# Patient Record
Sex: FEMALE | Race: WHITE | NOT HISPANIC OR LATINO
[De-identification: names, ages, dates, MRNs, and addresses within clinical notes are randomized per-mention and may not be internally consistent; named-entity substitution may affect disease eponyms.]

---

## 2017-06-27 ENCOUNTER — APPOINTMENT (OUTPATIENT)
Dept: ORTHOPEDIC SURGERY | Facility: CLINIC | Age: 67
End: 2017-06-27

## 2017-06-27 DIAGNOSIS — E03.9 HYPOTHYROIDISM, UNSPECIFIED: ICD-10-CM

## 2017-06-27 DIAGNOSIS — R73.03 PREDIABETES.: ICD-10-CM

## 2017-06-27 DIAGNOSIS — M25.562 PAIN IN LEFT KNEE: ICD-10-CM

## 2017-06-27 DIAGNOSIS — I10 ESSENTIAL (PRIMARY) HYPERTENSION: ICD-10-CM

## 2017-06-27 DIAGNOSIS — Z78.9 OTHER SPECIFIED HEALTH STATUS: ICD-10-CM

## 2017-06-27 RX ORDER — AMOXICILLIN 500 MG/1
500 CAPSULE ORAL
Qty: 8 | Refills: 0 | Status: COMPLETED | COMMUNITY
Start: 2017-06-13

## 2017-06-27 RX ORDER — LOSARTAN POTASSIUM 50 MG/1
50 TABLET, FILM COATED ORAL
Qty: 30 | Refills: 0 | Status: ACTIVE | COMMUNITY
Start: 2017-04-27

## 2017-06-27 RX ORDER — LINAGLIPTIN 5 MG/1
5 TABLET, FILM COATED ORAL
Qty: 30 | Refills: 0 | Status: ACTIVE | COMMUNITY
Start: 2016-12-29

## 2017-06-27 RX ORDER — LEVOTHYROXINE SODIUM 75 UG/1
75 TABLET ORAL
Qty: 30 | Refills: 0 | Status: ACTIVE | COMMUNITY
Start: 2017-06-01

## 2017-06-27 RX ORDER — NEBIVOLOL HYDROCHLORIDE 5 MG/1
5 TABLET ORAL
Qty: 30 | Refills: 0 | Status: ACTIVE | COMMUNITY
Start: 2017-04-03

## 2017-06-27 RX ORDER — AZITHROMYCIN 250 MG/1
250 TABLET, FILM COATED ORAL
Qty: 6 | Refills: 0 | Status: COMPLETED | COMMUNITY
Start: 2017-05-09

## 2017-06-27 RX ORDER — ATORVASTATIN CALCIUM 20 MG/1
20 TABLET, FILM COATED ORAL
Qty: 30 | Refills: 0 | Status: ACTIVE | COMMUNITY
Start: 2017-06-01

## 2017-06-27 RX ORDER — AMOXICILLIN 875 MG/1
875 TABLET, FILM COATED ORAL
Qty: 20 | Refills: 0 | Status: COMPLETED | COMMUNITY
Start: 2017-01-23

## 2017-06-27 RX ORDER — METFORMIN ER 500 MG 500 MG/1
500 TABLET ORAL
Qty: 60 | Refills: 0 | Status: ACTIVE | COMMUNITY
Start: 2017-05-22

## 2017-08-22 ENCOUNTER — APPOINTMENT (OUTPATIENT)
Dept: ORTHOPEDIC SURGERY | Facility: CLINIC | Age: 67
End: 2017-08-22
Payer: COMMERCIAL

## 2017-08-22 DIAGNOSIS — S83.242D OTHER TEAR OF MEDIAL MENISCUS, CURRENT INJURY, LEFT KNEE, SUBSEQUENT ENCOUNTER: ICD-10-CM

## 2017-08-22 PROCEDURE — 99213 OFFICE O/P EST LOW 20 MIN: CPT

## 2019-05-03 ENCOUNTER — APPOINTMENT (OUTPATIENT)
Dept: ORTHOPEDIC SURGERY | Facility: CLINIC | Age: 69
End: 2019-05-03
Payer: COMMERCIAL

## 2019-05-03 PROCEDURE — 99214 OFFICE O/P EST MOD 30 MIN: CPT

## 2019-05-03 NOTE — HISTORY OF PRESENT ILLNESS
[de-identified] : Ms. Bravo is now 69 y for evaluation of her foot which she injured yesterday. She was walking with other people and there was a decline in and then a barrier and she slipped and fell twisting her RIGHT foot. There is pain and she could not walk.\par She went to urgent care and had x-rays.She was told that there might be a fracture. They applied an Ace wrap and gave her an air cast splint and surgical shoe and crutches. Today she can put some weight on it. She did elevate. The wrap was feeling tight.

## 2019-05-03 NOTE — ASSESSMENT
[FreeTextEntry1] : 69 -year-old woman who twisted her RIGHT foot yesterday and appears to have a fracture of the cuboid clinically and may be visible on x-ray.\par She should use a surgical shoe and I applied an Ace wrap for some compression but it should not be too tight. I adjusted the crutches to the proper height and she can walk partial weightbearing on the RIGHT foot as pain allows. She should minimize weightbearing activity and walking. Elevate and ice for swelling. It will take 6-8 weeks for this to heal. If pain is getting worse she should let me know. She can move her ankle. She should sit on a stool in the shower. Tylenol or ibuprofen as needed.\par Followup in about 2-3 weeks

## 2019-05-03 NOTE — PHYSICAL EXAM
[Crutches] : ambulates with crutches [LE] : Sensory: Intact in bilateral lower extremities [DP] : dorsalis pedis 2+ and symmetric bilaterally [PT] : posterior tibial 2+ and symmetric bilaterally [de-identified] : bilateral feet\par Antalgic gait but she can walk partial weightbearing on the RIGHT.\par Moderate edema and ecchymoses RIGHT lateral midfoot or hindfoot.\par Skin is intact.\par Significant point tenderness over the cuboid laterally. No significant tenderness fifth metatarsal and minimally on the anterior process of the calcaneus. Nontender in the medial and lateral malleoli. Nontender anterior medial joint line. Minimally tender in the sinus Tarsi on the RIGHT. No tenderness LEFT.\par She can actively dorsiflex and plantar flex the RIGHT foot and ankle but with some pain and stiffness.\par Intact anterior tibial tendon, gastrocsoleus, peroneals and posterior tibial tendon and EHL.\par Foot is warm. Normal capillary refill. [de-identified] : \par She brought in x-rays from urgent care yesterday which were reviewed including 3 views of the RIGHT ankle AP, lateral and mortise and 3 views of the RIGHT foot. The ankle mortise is intact without any fractures. There is lucency in the medial talar dome consistent with an osteochondral lesion of the talus.\par On the foot x-rays there appears to be a carlos of bone lateral to the cuboid that may be consistent with a fracture and they may be seeing a hairline fracture through the cuboid nondisplaced but not definite. No other fractures seen and no malalignment.

## 2019-05-03 NOTE — PROCEDURE
[de-identified] : \par I wrapped a three-inch Ace wrap and applied the surgical shoe. She does not need the Aircast splint.

## 2019-05-21 ENCOUNTER — APPOINTMENT (OUTPATIENT)
Dept: ORTHOPEDIC SURGERY | Facility: CLINIC | Age: 69
End: 2019-05-21
Payer: COMMERCIAL

## 2019-05-21 PROCEDURE — 99213 OFFICE O/P EST LOW 20 MIN: CPT

## 2019-05-21 PROCEDURE — 73630 X-RAY EXAM OF FOOT: CPT | Mod: RT

## 2019-05-21 NOTE — ASSESSMENT
[FreeTextEntry1] : 70 yo with right foot twisting injury 2+ wks ago with Significant pain localized to the cuboid presumably with a nondisplaced fracture or bone bruising in the hindfoot/lateral ankle sprain. Her symptoms are improving.\par I recommended that she continue with either the walking boot or can transition to the surgical shoe as pain decreases and if the shoe becomes more comfortable. She was given a cane since she doesn't need crutches but was still limping. Elevate and ice. Ankle motion intermittently. Tylenol as needed. Followup in about 3-4 weeks.

## 2019-05-21 NOTE — PHYSICAL EXAM
[de-identified] : Right foot \par Resolving ecchymoses. Mild edema in the lateral hindfoot.\par Point tenderness localized to the cuboid. No significant tenderness fifth metatarsal, fourth metatarsal, calcaneus, malleoli. Nontender on the medial side of the foot.\par Intact ankle range of motion with about 70° dorsiflexion and 30° plantar flexion. Intact subtalar motion.\par Intact anterior tibial tendon, gastrocsoleus, peroneals, posterior tibial tendon and EHL all with good strength. Nails feel intact.\par Normal neurovascular exam. [de-identified] : \par x-rays right foot WB 3 views today show no visible fractures/abnormalities. Normal alignment.

## 2019-05-21 NOTE — HISTORY OF PRESENT ILLNESS
[de-identified] : Right foot is feeling partially better \par She is walking in the Walking boot most of the time and a surgical shoe at home. She stopped using the crutches. She finds the boot to be more supportive. There was a lot of bruising throughout the foot to the lateral ankle which is now getting better.She has pain in the lateral hindfoot

## 2019-06-10 ENCOUNTER — APPOINTMENT (OUTPATIENT)
Dept: ORTHOPEDIC SURGERY | Facility: CLINIC | Age: 69
End: 2019-06-10
Payer: COMMERCIAL

## 2019-06-10 PROCEDURE — 99213 OFFICE O/P EST LOW 20 MIN: CPT

## 2019-06-10 PROCEDURE — 73630 X-RAY EXAM OF FOOT: CPT | Mod: RT

## 2019-06-10 NOTE — HISTORY OF PRESENT ILLNESS
[de-identified] : 5 wks following foot/ankle sprain injury with suspected cuboid fx\par She has worn the surgical shoe and is walking without a cane. No pain walking in surgical  shoe now.\par No meds for it.\par There is a spot that is still very tender and it hurts to invert the foot. \par

## 2019-06-10 NOTE — PHYSICAL EXAM
[Slightly Antalgic] : slightly antalgic [Normal RLE] : Right Lower Extremity: No scars, rashes, lesions, ulcers, skin intact [Normal LLE] : Left Lower Extremity: No scars, rashes, lesions, ulcers, skin intact [de-identified] : Right Foot and Ankle: \par Gait is not antalgic.\par No Edema, ecchymoses, erythema.\par Ankle range of motion is with 7 degrees dorsiflexion and 35 degrees plantar flexion.\par Subtalar motion intact but pain on inversion in the lateral hindfoot.\par Hallux MP joint range of motion .  pain with ROM.\par Tender more dorsal cuboid and much less tender on the lateral cuboid and cuboid fifth metatarsal articulation. There may be some tenderness near the anterior process of the calcaneus and sinus tarsi area.\par  No Deformity.\par Motor: 5/5 ATT, GS , EHL, PTT/inversion, peroneals/eversion.\par Normal neurovascular exam\par  [de-identified] : \par x-rays of the right foot today 3 views show Intact cuboid and articulations appear intact. 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 I thought there might be an evulsion but looking back at other lateral x-rays have not seeing it.

## 2019-06-10 NOTE — ASSESSMENT
[FreeTextEntry1] : 68 yo right cuboid fx 5 wks ago healing.She should avoid inversion and eversion of any significant extent. She is going to wear a Ace wrap for some support and cushioning.\par Transition to supportive sneaker in the next 1-2 wks as tolerated. \par Warm soaks and ice and massage.\par Start PT in 2 wks.\par F/U 4 wks

## 2019-07-10 ENCOUNTER — APPOINTMENT (OUTPATIENT)
Dept: ORTHOPEDIC SURGERY | Facility: CLINIC | Age: 69
End: 2019-07-10
Payer: COMMERCIAL

## 2019-07-10 PROCEDURE — 99213 OFFICE O/P EST LOW 20 MIN: CPT

## 2019-07-10 NOTE — PHYSICAL EXAM
[LE] : Sensory: Intact in bilateral lower extremities [DP] : dorsalis pedis 2+ and symmetric bilaterally [PT] : posterior tibial 2+ and symmetric bilaterally [Normal LLE] : Left Lower Extremity: No scars, rashes, lesions, ulcers, skin intact [Normal RLE] : Right Lower Extremity: No scars, rashes, lesions, ulcers, skin intact [Normal Touch] : sensation intact for touch [de-identified] : RIGHT Foot and Ankle: \par Gait is not antalgic.\par The patient is able to walk on heels and toes and do a repetitive heel raise. More weakness doing a single heel raise on the RIGHT than LEFT but no significant pain\par slight antral lateral ankle Edema without ecchymoses, erythema.\par Ankle range of motion is with 7 degrees dorsiflexion and 35-40 degrees plantar flexion.\par Subtalar motion intact without pain.\par Hallux MP joint range of motion intact without pain with ROM.\par Tender mild the sinus Tarsi, between the calcaneal cuboid joint RIGHT.\par No Deformity except prominent navicular\par Motor: 5/5 ATT, GS , EHL, PTT/inversion, peroneals/eversion.\par Normal neurovascular exam\par

## 2019-07-10 NOTE — ASSESSMENT
[FreeTextEntry1] : A 69-year-old with RIGHT lateral hindfoot injury that look like a nondisplaced cuboid fracture.There may have been a slight avulsion anterior process calcaneus and she has an accessory navicular from the look and feel of it with minimal tenderness at that site. Overall the ankle and foot are improving with some mild residual discomfort. There is mild weakness. I recommended that she wear good supportive shoes such as sneakers. She can use the Ace support as needed. Ice and heat as needed. Strengthening exercises in physical therapy.\par Followup 6 weeks if it's not fully better

## 2019-07-10 NOTE — HISTORY OF PRESENT ILLNESS
[de-identified] : Ms. Bravo states that her RIGHT foot is feeling better but not 100%.\par RIGHT cuboid fracture occurred about 2 1/2 months ago.\par She is walking in Flat sneakers. She uses an Ace wrap support.\par She hasn't gone to physical therapy and it does feel somewhat weak.\par She walks without a limp and has been walking a fair bit and active. Mild residual swelling anterolateral ankle.\par She's not taking medication for  pain

## 2019-08-21 ENCOUNTER — APPOINTMENT (OUTPATIENT)
Dept: ORTHOPEDIC SURGERY | Facility: CLINIC | Age: 69
End: 2019-08-21
Payer: COMMERCIAL

## 2019-08-21 PROCEDURE — 99213 OFFICE O/P EST LOW 20 MIN: CPT

## 2019-08-21 NOTE — DISCUSSION/SUMMARY
[de-identified] : 69-year-old woman with RIGHT hindfoot sprain and probable cuboid nondisplaced fracture and possible tiny avulsion of the anterior process of the calcaneus. Her foot and ankle are doing much better but there is some lingering discomfort. Likely this will improve over the next couple months. She should do the home exercises she learned. Warm soaks and ice. Should walk and good supportive shoes as she is doing. If it's not better in the next 6-8 weeks she will come in for followup.\par If there is ongoing tenderness in the sinus Tarsi region I may consider a corticosteroid injection.

## 2019-08-21 NOTE — PHYSICAL EXAM
[LE] : Sensory: Intact in bilateral lower extremities [Normal RLE] : Right Lower Extremity: No scars, rashes, lesions, ulcers, skin intact [Normal LLE] : Left Lower Extremity: No scars, rashes, lesions, ulcers, skin intact [de-identified] : RIGHT  Foot and Ankle: \par Gait is not antalgic.\par The patient is able to walk on heels and toes without difficulty\par trace lateral hindfoot edema without ecchymoses, erythema.\par Ankle range of motion is with 7 degrees dorsiflexion and 35-40 degrees plantar flexion.\par Subtalar motion intact without any pain.\par Hallux MP joint range of motion intact without pain with ROM.\par Tender mildly in the sinus tarsi and over the cuboid .\par No Deformity.\par Motor: 5/5 ATT, GS , EHL, PTT/inversion, peroneals/eversion.\par Normal neurovascular exam\par

## 2019-08-21 NOTE — HISTORY OF PRESENT ILLNESS
[de-identified] : Ms. Bravo comes in for followup for her RIGHT foot injured 3-1/2 months ago with a cuboid fracture treated in a boot/surgical shoe. She has been doing physical therapy. Her RIGHT foot and ankle are feeling Mostly better but there is some lingering discomfort and tightness.\par She also gets some persistent mild swelling. He was dancing at a wedding a couple weeks ago in a small heel and this caused more pain temporarily. She's not taking anything for it now. She had done some physical therapy after I last saw her for about 4 weeks but then stopped because she didn't feel it was doing very much.

## 2019-09-06 ENCOUNTER — APPOINTMENT (OUTPATIENT)
Dept: OTOLARYNGOLOGY | Facility: CLINIC | Age: 69
End: 2019-09-06
Payer: COMMERCIAL

## 2019-09-06 VITALS
SYSTOLIC BLOOD PRESSURE: 123 MMHG | HEART RATE: 67 BPM | HEIGHT: 62 IN | WEIGHT: 145 LBS | DIASTOLIC BLOOD PRESSURE: 81 MMHG | BODY MASS INDEX: 26.68 KG/M2

## 2019-09-06 DIAGNOSIS — R05 COUGH: ICD-10-CM

## 2019-09-06 PROCEDURE — 31231 NASAL ENDOSCOPY DX: CPT

## 2019-09-06 PROCEDURE — 99213 OFFICE O/P EST LOW 20 MIN: CPT | Mod: 25

## 2019-09-06 NOTE — ASSESSMENT
[FreeTextEntry1] : YOUNG LOONEY has acute sinus infection and cough. I will prescribe Doxycycline and prednisone 30 mg taper (if needed). I will call with culture.

## 2019-09-06 NOTE — REVIEW OF SYSTEMS
[Nasal Congestion] : nasal congestion [Sinus Pain] : sinus pain [Sinus Pressure] : sinus pressure [Hoarseness] : hoarseness [Feeling Poorly] : feeling poorly [Feeling Tired] : feeling tired [Eye Pain] : eye pain [Patient Intake Form Reviewed] : Patient intake form was reviewed

## 2019-09-06 NOTE — HISTORY OF PRESENT ILLNESS
[de-identified] : YOUNG LOONEY is a 69 year woman with a history of several days of URI, facial pain congestion and cough. She is not having fever or chills. Her left ear hurts.She has upper dental throbbing pain.

## 2019-09-13 LAB — BACTERIA FLD CULT: ABNORMAL

## 2019-10-15 PROBLEM — S92.214D CLOSED NONDISPLACED FRACTURE OF CUBOID OF RIGHT FOOT WITH ROUTINE HEALING, SUBSEQUENT ENCOUNTER: Status: ACTIVE | Noted: 2019-05-03

## 2019-10-15 PROBLEM — S93.601D FOOT SPRAIN, RIGHT, SUBSEQUENT ENCOUNTER: Status: ACTIVE | Noted: 2019-07-10

## 2019-10-15 PROBLEM — M89.9 OSTEOCHONDRAL LESION OF TALAR DOME: Status: ACTIVE | Noted: 2019-05-03

## 2019-10-15 NOTE — PHYSICAL EXAM
[de-identified] : RIGHT  Foot and Ankle: \par Gait is not antalgic.\par The patient is able to walk on heels and toes and do a repetitive heel raise\par  Edema, ecchymoses, erythema.\par Ankle range of motion is with  degrees dorsiflexion and  degrees plantar flexion.\par Subtalar motion .\par Hallux MP joint range of motion .  pain with ROM.\par Tender .\par  Deformity.\par Motor: 5/5 ATT, GS , EHL, PTT/inversion, peroneals/eversion.\par Normal neurovascular exam\par

## 2019-10-15 NOTE — HISTORY OF PRESENT ILLNESS
[de-identified] : 5 months following RIGHT foot twisting injury with suspected nondisplaced cuboid fracturetreated with immobilization followed by physical therapy.\par Her foot is feeling

## 2019-10-15 NOTE — DISCUSSION/SUMMARY
[de-identified] : About 5 months following a RIGHT foot twisting injury with sprain and suspected cuboid fracture

## 2019-10-16 ENCOUNTER — APPOINTMENT (OUTPATIENT)
Dept: ORTHOPEDIC SURGERY | Facility: CLINIC | Age: 69
End: 2019-10-16

## 2019-10-16 DIAGNOSIS — S92.214D NONDISPLACED FRACTURE OF CUBOID BONE OF RIGHT FOOT, SUBSEQUENT ENCOUNTER FOR FRACTURE WITH ROUTINE HEALING: ICD-10-CM

## 2019-10-16 DIAGNOSIS — M94.9 DISORDER OF BONE, UNSPECIFIED: ICD-10-CM

## 2019-10-16 DIAGNOSIS — M89.9 DISORDER OF BONE, UNSPECIFIED: ICD-10-CM

## 2019-10-16 DIAGNOSIS — S93.601D UNSPECIFIED SPRAIN OF RIGHT FOOT, SUBSEQUENT ENCOUNTER: ICD-10-CM

## 2019-10-22 ENCOUNTER — APPOINTMENT (OUTPATIENT)
Dept: OTOLARYNGOLOGY | Facility: CLINIC | Age: 69
End: 2019-10-22
Payer: COMMERCIAL

## 2019-10-22 DIAGNOSIS — H90.42 SENSORINEURAL HEARING LOSS, UNILATERAL, LEFT EAR, WITH UNRESTRICTED HEARING ON THE CONTRALATERAL SIDE: ICD-10-CM

## 2019-10-22 DIAGNOSIS — R42 DIZZINESS AND GIDDINESS: ICD-10-CM

## 2019-10-22 PROCEDURE — 99213 OFFICE O/P EST LOW 20 MIN: CPT

## 2019-10-22 PROCEDURE — 92567 TYMPANOMETRY: CPT

## 2019-10-22 PROCEDURE — 92557 COMPREHENSIVE HEARING TEST: CPT

## 2019-10-30 PROBLEM — R42 VERTIGO: Status: ACTIVE | Noted: 2019-10-30

## 2019-10-30 NOTE — ASSESSMENT
[FreeTextEntry1] : She has low grade symptoms.  She is carrying on ADL.\par I will treat her for a vestibular neuronitis.\par Pending her clinical course she will see me in fu.\par She will also get me a copy of her audiogram from last year.\par She may need and Mri to rule out a  retrocochlear lesion.\par \par

## 2019-10-30 NOTE — HISTORY OF PRESENT ILLNESS
[de-identified] : She has a few days of spontaneous imbalance, dizziness and perhaps positional vertigo.\par She does not report new onset of tinnitus or change in hearing.\par She does report that she had an audiogram approximately one year ago and that doctor told her there was a small hearing loss in one of the ears.

## 2020-01-16 ENCOUNTER — APPOINTMENT (OUTPATIENT)
Dept: OTOLARYNGOLOGY | Facility: CLINIC | Age: 70
End: 2020-01-16
Payer: COMMERCIAL

## 2020-01-16 VITALS
HEART RATE: 77 BPM | DIASTOLIC BLOOD PRESSURE: 84 MMHG | SYSTOLIC BLOOD PRESSURE: 132 MMHG | BODY MASS INDEX: 26.68 KG/M2 | WEIGHT: 145 LBS | HEIGHT: 62 IN

## 2020-01-16 DIAGNOSIS — R49.0 DYSPHONIA: ICD-10-CM

## 2020-01-16 DIAGNOSIS — Z87.09 PERSONAL HISTORY OF OTHER DISEASES OF THE RESPIRATORY SYSTEM: ICD-10-CM

## 2020-01-16 DIAGNOSIS — R13.10 DYSPHAGIA, UNSPECIFIED: ICD-10-CM

## 2020-01-16 DIAGNOSIS — R13.11 DYSPHAGIA, ORAL PHASE: ICD-10-CM

## 2020-01-16 PROCEDURE — 99213 OFFICE O/P EST LOW 20 MIN: CPT | Mod: 25

## 2020-01-16 PROCEDURE — 31575 DIAGNOSTIC LARYNGOSCOPY: CPT

## 2020-01-16 RX ORDER — LOSARTAN POTASSIUM 25 MG/1
25 TABLET, FILM COATED ORAL DAILY
Qty: 30 | Refills: 0 | Status: DISCONTINUED | COMMUNITY
Start: 2017-05-08 | End: 2020-01-16

## 2020-01-16 RX ORDER — DOXYCYCLINE HYCLATE 100 MG/1
100 CAPSULE ORAL
Qty: 20 | Refills: 0 | Status: DISCONTINUED | COMMUNITY
Start: 2019-09-06 | End: 2020-01-16

## 2020-01-16 RX ORDER — PREDNISONE 10 MG/1
10 TABLET ORAL
Qty: 12 | Refills: 0 | Status: DISCONTINUED | COMMUNITY
Start: 2019-09-06 | End: 2020-01-16

## 2020-01-16 RX ORDER — METHYLPREDNISOLONE 4 MG/1
4 TABLET ORAL
Qty: 1 | Refills: 0 | Status: DISCONTINUED | COMMUNITY
Start: 2019-10-22 | End: 2020-01-16

## 2020-01-16 NOTE — ASSESSMENT
[FreeTextEntry1] : YOUNG LOONEY has severe sore throat without evidence of supraglottitis. I will start her on Augmentin 875 mg. She will push fluids.

## 2020-01-16 NOTE — HISTORY OF PRESENT ILLNESS
[de-identified] : YOUNG LOONEY is a 69 year woman with a history of being exposed to granddaughter and daughter who have been sick. She complains of 24 hours of very sever throat pain, difficulty swallowing food and hoarseness.

## 2020-01-16 NOTE — REVIEW OF SYSTEMS
[Post Nasal Drip] : post nasal drip [Ear Pain] : ear pain [Hoarseness] : hoarseness [Throat Dryness] : throat dryness [Throat Pain] : throat pain [Feeling Poorly] : feeling poorly [Feeling Tired] : feeling tired [Cough] : cough [Patient Intake Form Reviewed] : Patient intake form was reviewed

## 2020-01-22 LAB — BACTERIA THROAT CULT: NORMAL

## 2020-08-05 ENCOUNTER — APPOINTMENT (OUTPATIENT)
Dept: OTOLARYNGOLOGY | Facility: CLINIC | Age: 70
End: 2020-08-05
Payer: COMMERCIAL

## 2020-08-05 DIAGNOSIS — J00 ACUTE NASOPHARYNGITIS [COMMON COLD]: ICD-10-CM

## 2020-08-05 PROCEDURE — 99213 OFFICE O/P EST LOW 20 MIN: CPT | Mod: 25

## 2020-08-05 PROCEDURE — 31231 NASAL ENDOSCOPY DX: CPT

## 2020-08-05 NOTE — ASSESSMENT
[FreeTextEntry1] : YOUNG LOONEY has facial pain. She has strong evidence for LPR which I think is causing her symptoms. I suggested and discussed in detail a strict reflux diet and gave the patient a handout.\par I am recommending Prilosec 20 mg 30-40 minutes before breakfast on an empty stomach.\par I am recommending Pepcid 20mg  before bedtime.\par RTC 3-4 weeks.\par

## 2020-08-05 NOTE — CONSULT LETTER
[Consult Closing:] : Thank you very much for allowing me to participate in the care of this patient.  If you have any questions, please do not hesitate to contact me. [Consult Letter:] : I had the pleasure of evaluating your patient, [unfilled]. [Dear  ___] : Dear  [unfilled], [FreeTextEntry3] : Vijay Montoya MD\par  [Sincerely,] : Sincerely,

## 2020-08-05 NOTE — HISTORY OF PRESENT ILLNESS
[de-identified] : YOUNG LOONEY is a 70 year woman with a history of several weeks of throbbing of her nose and upper teeth. She used Augmentin then Doxycycline without improvement. Her nose is clear.

## 2020-09-29 ENCOUNTER — APPOINTMENT (OUTPATIENT)
Dept: OTOLARYNGOLOGY | Facility: CLINIC | Age: 70
End: 2020-09-29
Payer: COMMERCIAL

## 2020-09-29 VITALS — BODY MASS INDEX: 26.68 KG/M2 | HEIGHT: 62 IN | WEIGHT: 145 LBS | TEMPERATURE: 98.6 F

## 2020-09-29 VITALS — TEMPERATURE: 98.6 F | HEIGHT: 62 IN | BODY MASS INDEX: 26.68 KG/M2 | WEIGHT: 145 LBS

## 2020-09-29 DIAGNOSIS — J00 ACUTE NASOPHARYNGITIS [COMMON COLD]: ICD-10-CM

## 2020-09-29 PROCEDURE — 31575 DIAGNOSTIC LARYNGOSCOPY: CPT

## 2020-09-29 PROCEDURE — 99213 OFFICE O/P EST LOW 20 MIN: CPT | Mod: 25

## 2020-09-29 NOTE — HISTORY OF PRESENT ILLNESS
[de-identified] : YOUNG LOONEY is a 70 year woman with a history of CRS and LPR. I recently treated her with reflux diet and prilosec and pepcid and she is feeling much better.

## 2020-12-23 PROBLEM — Z87.09 HISTORY OF PHARYNGITIS: Status: RESOLVED | Noted: 2020-01-16 | Resolved: 2020-12-23

## 2021-05-27 ENCOUNTER — APPOINTMENT (OUTPATIENT)
Dept: OTOLARYNGOLOGY | Facility: CLINIC | Age: 71
End: 2021-05-27
Payer: COMMERCIAL

## 2021-05-27 DIAGNOSIS — J01.00 ACUTE MAXILLARY SINUSITIS, UNSPECIFIED: ICD-10-CM

## 2021-05-27 DIAGNOSIS — H92.02 OTALGIA, LEFT EAR: ICD-10-CM

## 2021-05-27 PROCEDURE — 99072 ADDL SUPL MATRL&STAF TM PHE: CPT

## 2021-05-27 PROCEDURE — 31231 NASAL ENDOSCOPY DX: CPT

## 2021-05-27 PROCEDURE — 99214 OFFICE O/P EST MOD 30 MIN: CPT | Mod: 25

## 2021-05-27 RX ORDER — FAMOTIDINE 40 MG/1
TABLET, FILM COATED ORAL
Refills: 0 | Status: ACTIVE | COMMUNITY

## 2021-05-27 RX ORDER — PANTOPRAZOLE 40 MG/1
TABLET, DELAYED RELEASE ORAL
Refills: 0 | Status: ACTIVE | COMMUNITY

## 2021-05-27 RX ORDER — FLUTICASONE PROPIONATE 50 MCG
SPRAY, SUSPENSION NASAL
Refills: 0 | Status: ACTIVE | COMMUNITY

## 2021-05-27 NOTE — REASON FOR VISIT
[Subsequent Evaluation] : a subsequent evaluation for [FreeTextEntry2] : sinus pressure and headache

## 2021-05-27 NOTE — ASSESSMENT
[FreeTextEntry1] : YOUNG LOONEY is having right facial pain. I cultured her nose. I think her LPR may be causing this. She has had similar situation several times in the past. She will go back to [facility]. I suggested a soft diet, chewing evenly and avoiding nuts and gum.\par I also suggested heat and gentle massage to trapezius muscle.\par I also suggested an OTC ([employer]) grinding guard.\par I will call with culture.\par

## 2021-05-27 NOTE — CONSULT LETTER
[Dear  ___] : Dear  [unfilled], [Consult Letter:] : I had the pleasure of evaluating your patient, [unfilled]. [Please see my note below.] : Please see my note below. [Consult Closing:] : Thank you very much for allowing me to participate in the care of this patient.  If you have any questions, please do not hesitate to contact me. [Sincerely,] : Sincerely, [FreeTextEntry3] : Vijay Montoya MD\par

## 2021-05-27 NOTE — HISTORY OF PRESENT ILLNESS
[de-identified] : YOUNG LOONEY is a 71 year woman with a history of CRS and LPR. She has several weeks of right facial pressure. She has some PND. Her left ear is "bothering her." She was recently switched from Prilosec to Protonix by GI and she has been having heartburn.

## 2021-06-02 LAB — BACTERIA FLD CULT: NORMAL

## 2021-12-10 ENCOUNTER — APPOINTMENT (OUTPATIENT)
Dept: OTOLARYNGOLOGY | Facility: CLINIC | Age: 71
End: 2021-12-10

## 2022-10-05 ENCOUNTER — APPOINTMENT (OUTPATIENT)
Dept: OTOLARYNGOLOGY | Facility: CLINIC | Age: 72
End: 2022-10-05

## 2022-10-05 VITALS
SYSTOLIC BLOOD PRESSURE: 129 MMHG | DIASTOLIC BLOOD PRESSURE: 89 MMHG | HEIGHT: 62 IN | WEIGHT: 145 LBS | TEMPERATURE: 97 F | OXYGEN SATURATION: 98 % | BODY MASS INDEX: 26.68 KG/M2 | HEART RATE: 64 BPM

## 2022-10-05 DIAGNOSIS — M26.609 UNSPECIFIED TEMPOROMANDIBULAR JOINT DISORDER: ICD-10-CM

## 2022-10-05 DIAGNOSIS — J31.0 CHRONIC RHINITIS: ICD-10-CM

## 2022-10-05 DIAGNOSIS — R51.9 HEADACHE, UNSPECIFIED: ICD-10-CM

## 2022-10-05 DIAGNOSIS — J32.9 CHRONIC RHINITIS: ICD-10-CM

## 2022-10-05 PROCEDURE — 99213 OFFICE O/P EST LOW 20 MIN: CPT | Mod: 25

## 2022-10-05 PROCEDURE — 31231 NASAL ENDOSCOPY DX: CPT

## 2022-10-05 NOTE — CONSULT LETTER
[Dear  ___] : Dear  [unfilled], [Courtesy Letter:] : I had the pleasure of seeing your patient, [unfilled], in my office today. [Please see my note below.] : Please see my note below. [Consult Closing:] : Thank you very much for allowing me to participate in the care of this patient.  If you have any questions, please do not hesitate to contact me. [FreeTextEntry3] : Stephanie Pedroza MD\par

## 2022-10-05 NOTE — HISTORY OF PRESENT ILLNESS
[de-identified] : YOUNG LOONEY is a 72 year old patient who is well-known to Dr. Montoya.  She has a history of chronic rhinosinusitis and reflux.  She has had headaches since Sunday.  She describes pain and throbbing over the midface.  She does not have significant nasal congestion, obstruction, or nasal drainage.  The facial pain is worse on the right side.  She said COVID testing this morning was negative. She does not feel sick.  She does suffer from seasonal allergies.  She tried Flonase.  She has not tried any antihistamine.\par \par She has a history of TMJ dysfunction.  She does not using a nightguard at this time\par She has seasonal allergies\par She has a history of reflux and is on medication

## 2022-10-05 NOTE — ASSESSMENT
[FreeTextEntry1] : She has had headaches and facial pain since Sunday.  She does not feel sick and she said COVID testing was negative.  She does suffer from seasonal allergies and likely has TMJ dysfunction.  On nasal endoscopy there is no purulent drainage or congestion.  I do not see obvious signs of a sinus infection.  She does have a history of reflux and is on medication.  I spoke with her about other sources of facial pain and headaches.  It is possible her symptoms could be due to TMJ dysfunction, tension headaches, migraines, or trigeminal neuralgia.\par \par Plan\par -Findings and management options were discussed with the patient.\par -Flonase as needed.  She may try Claritin to see if that helps.  She is unable to take any decongestants because of palpitations\par -Treatment for TMJ dysfunction recommended.  She should wear the nightguard\par -Continue management of reflux\par -I spoke with her about further evaluation.  I asked her to consider neurology evaluation to rule out other sources of the headaches.  We could also send her for CT scan of the sinuses.  She said that she did have a couple years ago.  If she is able to, I asked her to send us any test results\par -I have asked her to follow-up with Dr. Montoya or myself if her symptoms are not improving.  She was told if the headaches are severe or she has any other symptoms, she should go to urgent care or the ER.
immune

## 2022-12-27 ENCOUNTER — APPOINTMENT (OUTPATIENT)
Dept: OTOLARYNGOLOGY | Facility: CLINIC | Age: 72
End: 2022-12-27

## 2022-12-27 VITALS — HEIGHT: 62 IN | WEIGHT: 135 LBS | TEMPERATURE: 96 F | BODY MASS INDEX: 24.84 KG/M2

## 2022-12-27 DIAGNOSIS — R51.9 HEADACHE, UNSPECIFIED: ICD-10-CM

## 2022-12-27 PROCEDURE — 31231 NASAL ENDOSCOPY DX: CPT

## 2022-12-27 PROCEDURE — 99213 OFFICE O/P EST LOW 20 MIN: CPT | Mod: 25

## 2022-12-27 RX ORDER — DOXYCYCLINE HYCLATE 100 MG/1
100 TABLET ORAL
Qty: 20 | Refills: 0 | Status: ACTIVE | COMMUNITY
Start: 2022-12-20

## 2022-12-27 RX ORDER — ATORVASTATIN CALCIUM 10 MG/1
10 TABLET, FILM COATED ORAL
Qty: 30 | Refills: 0 | Status: ACTIVE | COMMUNITY
Start: 2022-07-19

## 2022-12-27 RX ORDER — MOLNUPIRAVIR 200 MG/1
200 CAPSULE ORAL
Qty: 40 | Refills: 0 | Status: ACTIVE | COMMUNITY
Start: 2022-10-04

## 2022-12-27 RX ORDER — AMOXICILLIN AND CLAVULANATE POTASSIUM 875; 125 MG/1; MG/1
875-125 TABLET, COATED ORAL
Qty: 20 | Refills: 0 | Status: ACTIVE | COMMUNITY
Start: 2022-11-07

## 2022-12-27 RX ORDER — MUPIROCIN 20 MG/G
2 OINTMENT TOPICAL
Qty: 22 | Refills: 0 | Status: ACTIVE | COMMUNITY
Start: 2022-07-26

## 2022-12-27 RX ORDER — PREDNISOLONE ACETATE 10 MG/ML
1 SUSPENSION/ DROPS OPHTHALMIC
Qty: 5 | Refills: 0 | Status: ACTIVE | COMMUNITY
Start: 2022-12-19

## 2022-12-27 RX ORDER — FOSFOMYCIN TROMETHAMINE 3 G/1
3 POWDER ORAL
Qty: 1 | Refills: 0 | Status: ACTIVE | COMMUNITY
Start: 2022-10-14

## 2022-12-27 RX ORDER — OFLOXACIN 3 MG/ML
0.3 SOLUTION/ DROPS OPHTHALMIC
Qty: 5 | Refills: 0 | Status: ACTIVE | COMMUNITY
Start: 2022-12-19

## 2022-12-27 RX ORDER — OSELTAMIVIR PHOSPHATE 75 MG/1
75 CAPSULE ORAL
Qty: 10 | Refills: 0 | Status: ACTIVE | COMMUNITY
Start: 2022-12-14

## 2022-12-27 RX ORDER — SULFAMETHOXAZOLE AND TRIMETHOPRIM 800; 160 MG/1; MG/1
800-160 TABLET ORAL
Qty: 20 | Refills: 0 | Status: ACTIVE | COMMUNITY
Start: 2022-07-13

## 2022-12-27 RX ORDER — NITROFURANTOIN (MONOHYDRATE/MACROCRYSTALS) 25; 75 MG/1; MG/1
100 CAPSULE ORAL
Qty: 20 | Refills: 0 | Status: ACTIVE | COMMUNITY
Start: 2022-11-05

## 2022-12-27 NOTE — REVIEW OF SYSTEMS
[Nasal Congestion] : nasal congestion [Sinus Pressure] : sinus pressure [Negative] : Heme/Lymph [de-identified] : t [FreeTextEntry4] : mild headache

## 2022-12-27 NOTE — HISTORY OF PRESENT ILLNESS
[de-identified] : YOUNG LOONEY is a 72 year woman with a history of recent COVID. Overall she is better. She has some facial and nasal pain.

## 2023-01-03 LAB — EAR NOSE AND THROAT CULTURE: NORMAL

## 2023-08-10 ENCOUNTER — APPOINTMENT (OUTPATIENT)
Dept: OTOLARYNGOLOGY | Facility: CLINIC | Age: 73
End: 2023-08-10
Payer: COMMERCIAL

## 2023-08-10 VITALS — WEIGHT: 135 LBS | HEIGHT: 62 IN | BODY MASS INDEX: 24.84 KG/M2

## 2023-08-10 DIAGNOSIS — H92.09 OTALGIA, UNSPECIFIED EAR: ICD-10-CM

## 2023-08-10 DIAGNOSIS — H61.23 IMPACTED CERUMEN, BILATERAL: ICD-10-CM

## 2023-08-10 PROCEDURE — 69210 REMOVE IMPACTED EAR WAX UNI: CPT

## 2023-08-10 PROCEDURE — 99213 OFFICE O/P EST LOW 20 MIN: CPT | Mod: 25

## 2023-08-10 NOTE — HISTORY OF PRESENT ILLNESS
[de-identified] : YOUNG LOONEY is a 73 year woman with a history of recent left ear and throat pain and itchiness. She went to  and was given Amoxicillin. She feels better.

## 2023-08-10 NOTE — PHYSICAL EXAM
[TextEntry] : PHYSICAL EXAM  General: The patient was alert and oriented and in no distress. Voice was normal.  Neurologic: Cranial Nerves II-XII intact PERRLA There was no significant nystagmus or disconjugate gaze noted.  EOM's: Normal  Face: The patient had no facial asymmetry or mass. The skin was unremarkable.  Ears: External ears were normal without deformity. Ear canals were normal. Tympanic membranes were intact and normal. No perforation or effusion I removed impacted cerumen from both ears under the microscope with curet, forceps and suction.  Nose:  The external nose had no significant deformity.  There was no facial tenderness.  On anterior rhinoscopy, the nasal mucosa was normal.  The anterior septum was normal. There were no visualized polyps purulence  or masses.  Oral cavity: The oral mucosa was normal. The oral and base of tongue were clear and without mass. The gingival and buccal mucosa were moist and without lesions. The palate moved well. There was no cleft palate. There appeared to be good salivary flow.   There was no pus, erythema or mass in the oral cavity/oropharynx.  Neck:  The neck was symmetrical. The parotid and submandibular glands were normal without masses. The trachea was midline and there was no unusual crepitus. Thyroid was smooth and nontender and no masses were palpated. Cervical adenopathy- none.

## 2023-08-10 NOTE — REVIEW OF SYSTEMS
[Negative] : Heme/Lymph [Patient Intake Form Reviewed] : Patient intake form was reviewed [de-identified] : ear infection

## 2023-08-10 NOTE — ASSESSMENT
[FreeTextEntry1] : YOUNG OLIVERA has no evidence of infection. She felt better after I removed impacted cerumen AU. RTC prn.

## 2024-01-09 ENCOUNTER — APPOINTMENT (OUTPATIENT)
Dept: OTOLARYNGOLOGY | Facility: CLINIC | Age: 74
End: 2024-01-09
Payer: COMMERCIAL

## 2024-01-09 DIAGNOSIS — H92.02 OTALGIA, LEFT EAR: ICD-10-CM

## 2024-01-09 DIAGNOSIS — K21.9 GASTRO-ESOPHAGEAL REFLUX DISEASE W/OUT ESOPHAGITIS: ICD-10-CM

## 2024-01-09 PROCEDURE — 31575 DIAGNOSTIC LARYNGOSCOPY: CPT

## 2024-01-09 PROCEDURE — 99213 OFFICE O/P EST LOW 20 MIN: CPT | Mod: 25

## 2024-01-09 PROCEDURE — 99212 OFFICE O/P EST SF 10 MIN: CPT

## 2024-01-09 RX ORDER — HYDROCHLOROTHIAZIDE 12.5 MG/1
TABLET ORAL
Refills: 0 | Status: ACTIVE | COMMUNITY

## 2024-03-26 ENCOUNTER — APPOINTMENT (OUTPATIENT)
Dept: OTOLARYNGOLOGY | Facility: CLINIC | Age: 74
End: 2024-03-26
Payer: COMMERCIAL

## 2024-03-26 DIAGNOSIS — J01.00 ACUTE MAXILLARY SINUSITIS, UNSPECIFIED: ICD-10-CM

## 2024-03-26 DIAGNOSIS — K21.9 GASTRO-ESOPHAGEAL REFLUX DISEASE W/OUT ESOPHAGITIS: ICD-10-CM

## 2024-03-26 PROCEDURE — 99213 OFFICE O/P EST LOW 20 MIN: CPT | Mod: 25

## 2024-03-26 PROCEDURE — 31231 NASAL ENDOSCOPY DX: CPT

## 2024-03-26 RX ORDER — AMOXICILLIN AND CLAVULANATE POTASSIUM 875; 125 MG/1; MG/1
875-125 TABLET, COATED ORAL TWICE DAILY
Qty: 20 | Refills: 0 | Status: ACTIVE | COMMUNITY
Start: 2024-03-26 | End: 1900-01-01

## 2024-03-26 NOTE — HISTORY OF PRESENT ILLNESS
[de-identified] : YOUNG LOONEY  is a 73 year woman with a history of 5 days of primarily right sided facial pain.

## 2024-03-26 NOTE — PHYSICAL EXAM
[TextEntry] : PHYSICAL EXAM  General: The patient was alert and oriented and in no distress. Voice was normal.  Neurologic: Cranial Nerves II-XII intact PERRLA There was no significant nystagmus or disconjugate gaze noted.  EOM's: Normal  Face: The patient had no facial asymmetry or mass. The skin was unremarkable.  Ears: External ears were normal without deformity. Ear canals were normal. Tympanic membranes were intact and normal. No perforation or effusion  Nose:  The external nose had no significant deformity.  There was no facial tenderness.  On anterior rhinoscopy, the nasal mucosa was normal.  The anterior septum was normal. There were no visualized polyps purulence  or masses.  Oral cavity: The oral mucosa was normal. The oral and base of tongue were clear and without mass. The gingival and buccal mucosa were moist and without lesions. The palate moved well. There was no cleft palate. There appeared to be good salivary flow.   There was no pus, erythema or mass in the oral cavity/oropharynx.  Neck:  The neck was symmetrical. The parotid and submandibular glands were normal without masses. The trachea was midline and there was no unusual crepitus. Thyroid was smooth and nontender and no masses were palpated. Cervical adenopathy- none.  Procedure: Nasal Endoscopy  Diagnosis: Chronic sinusitis  Dr. Vijay Montoya  Anesthesia: Topical Lidocaine 2% and oxymetazoline  Procedure: The fiberoptic endoscope was introduced into the right nostril and passed along the floor of the nose and then  along the middle meatus. The same procedure was carried out  on the left side.  Findings:   Septum: mildly deviated bilaterally         Right:    Middle Turbinate: normal Middle meatus congested no polyps or pus  Superior meatus:normal SER:normal Inferior Turbinate: normal  Left:      Middle Turbinate: normal Middle meatus congested no polyps or pus Superior meatus:normal SER:normal Inferior Turbinate: normal  I cultured   the right middle meatus

## 2024-03-26 NOTE — ASSESSMENT
[FreeTextEntry1] : YOUNG LOONEY has acute sinus infection and LPR. I will start her on Augmentin. I will call with culture.

## 2024-03-26 NOTE — REVIEW OF SYSTEMS
Contacted patient's daughter, Kiana about discharge. She asked that we wait until this afternoon to send her home. She is with her father at another doctor appointment.    [Negative] : Endocrine

## 2024-04-02 LAB — EAR NOSE AND THROAT CULTURE: ABNORMAL

## 2024-07-08 ENCOUNTER — APPOINTMENT (OUTPATIENT)
Dept: OTOLARYNGOLOGY | Facility: CLINIC | Age: 74
End: 2024-07-08
Payer: COMMERCIAL

## 2024-07-08 DIAGNOSIS — R09.81 NASAL CONGESTION: ICD-10-CM

## 2024-07-08 DIAGNOSIS — K21.9 GASTRO-ESOPHAGEAL REFLUX DISEASE W/OUT ESOPHAGITIS: ICD-10-CM

## 2024-07-08 DIAGNOSIS — J34.2 DEVIATED NASAL SEPTUM: ICD-10-CM

## 2024-07-08 PROCEDURE — 31231 NASAL ENDOSCOPY DX: CPT

## 2024-07-08 PROCEDURE — 99214 OFFICE O/P EST MOD 30 MIN: CPT | Mod: 25

## 2024-07-08 PROCEDURE — 99212 OFFICE O/P EST SF 10 MIN: CPT | Mod: 25

## 2025-01-22 ENCOUNTER — APPOINTMENT (OUTPATIENT)
Dept: SURGERY | Facility: CLINIC | Age: 75
End: 2025-01-22
Payer: COMMERCIAL

## 2025-01-22 PROCEDURE — 99205K: CUSTOM

## 2025-04-22 ENCOUNTER — APPOINTMENT (OUTPATIENT)
Dept: OTOLARYNGOLOGY | Facility: CLINIC | Age: 75
End: 2025-04-22
Payer: COMMERCIAL

## 2025-04-22 DIAGNOSIS — J00 ACUTE NASOPHARYNGITIS [COMMON COLD]: ICD-10-CM

## 2025-04-22 DIAGNOSIS — H92.02 OTALGIA, LEFT EAR: ICD-10-CM

## 2025-04-22 PROCEDURE — 31231 NASAL ENDOSCOPY DX: CPT

## 2025-04-22 RX ORDER — AMOXICILLIN AND CLAVULANATE POTASSIUM 875; 125 MG/1; MG/1
875-125 TABLET, COATED ORAL TWICE DAILY
Qty: 20 | Refills: 0 | Status: ACTIVE | COMMUNITY
Start: 2025-04-22 | End: 1900-01-01